# Patient Record
Sex: MALE | Race: WHITE
[De-identification: names, ages, dates, MRNs, and addresses within clinical notes are randomized per-mention and may not be internally consistent; named-entity substitution may affect disease eponyms.]

---

## 2017-01-03 ENCOUNTER — HOSPITAL ENCOUNTER (EMERGENCY)
Dept: HOSPITAL 47 - EC | Age: 22
Discharge: HOME | End: 2017-01-03
Payer: COMMERCIAL

## 2017-01-03 VITALS — HEART RATE: 92 BPM | TEMPERATURE: 98.7 F | DIASTOLIC BLOOD PRESSURE: 85 MMHG | SYSTOLIC BLOOD PRESSURE: 177 MMHG

## 2017-01-03 VITALS — RESPIRATION RATE: 18 BRPM

## 2017-01-03 DIAGNOSIS — S09.90XA: Primary | ICD-10-CM
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
## 2017-01-03 DIAGNOSIS — Z91.048: ICD-10-CM

## 2017-01-03 PROCEDURE — 72125 CT NECK SPINE W/O DYE: CPT

## 2017-01-03 PROCEDURE — 99284 EMERGENCY DEPT VISIT MOD MDM: CPT

## 2017-01-03 PROCEDURE — 70450 CT HEAD/BRAIN W/O DYE: CPT
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
## 2018-08-10 ENCOUNTER — HOSPITAL ENCOUNTER (EMERGENCY)
Dept: HOSPITAL 47 - EC | Age: 23
Discharge: HOME | End: 2018-08-10
Payer: COMMERCIAL

## 2018-08-10 VITALS
RESPIRATION RATE: 80 BRPM | DIASTOLIC BLOOD PRESSURE: 55 MMHG | TEMPERATURE: 98.1 F | HEART RATE: 18 BPM | SYSTOLIC BLOOD PRESSURE: 116 MMHG

## 2018-08-10 DIAGNOSIS — Z91.048: ICD-10-CM

## 2018-08-10 DIAGNOSIS — R10.9: Primary | ICD-10-CM

## 2018-08-10 PROCEDURE — 99283 EMERGENCY DEPT VISIT LOW MDM: CPT

## 2018-08-10 NOTE — ED
General Adult HPI





- General


Chief complaint: Abdominal Pain


Stated complaint: Sharp pain gallbladder related- 2-3 weeks


Time Seen by Provider: 08/10/18 22:09


Source: patient


Mode of arrival: ambulatory


Limitations: no limitations





- History of Present Illness


Initial comments: 


Tenzin is a 22-year-old previously healthy male with no significant past medical 

history presents to the ED today with a complaint of intermittent abdominal 

pain and the need for a work note.  Patient reports that for a number of years 

he has experienced intermittent abdominal cramping and frequent stooling.  

Patient reports that this is exacerbated by eating anything spicy.  Patient 

states that over the past 2-3 weeks he seems to be having more frequent 

episodes of abdominal cramping and loose stools.  He states that after eating he

'll have cramping that radiates from his left abdomen to his right abdomen and 

will subsequently have an episode of loose nonbloody stool.  He reports that he 

only has 1-2 stools daily.  He still been eating a normal diet and admits that 

he has a very unhealthy eating habits.  He states that he usually is only 1-2 

meals a day, eats a lot of fast food and junk food.  He reports that despite 

his symptoms being exacerbated by eating spicy food he chooses to eat this 

because of his favorite food.  Patient states that today he had abdominal 

cramping and was unable to go to work, he sought care through the urgent cares 

looking for a work note but they advised that with abdominal pain he needs to 

be evaluated in the emergency department so he came here.  Patient reports that 

his abdominal cramping subsided hours before arrival and he feels well.  He 

states that he is eager to go home and doesn't feel that he needs any further 

workup but does need a work note.





- Related Data


 Previous Rx's











 Medication  Instructions  Recorded


 


Dicyclomine [Bentyl] 10 mg PO TID #30 capsule 08/10/18











 Allergies











Allergy/AdvReac Type Severity Reaction Status Date / Time


 


cat dander Allergy  Dyspnea Verified 08/10/18 21:27














Review of Systems


ROS Statement: 


Those systems with pertinent positive or pertinent negative responses have been 

documented in the HPI.





ROS Other: All systems not noted in ROS Statement are negative.





Past Medical History


Past Medical History: No Reported History


History of Any Multi-Drug Resistant Organisms: None Reported


Past Surgical History: Orthopedic Surgery


Additional Past Surgical History / Comment(s): right arm


Past Psychological History: No Psychological Hx Reported


Smoking Status: Never smoker


Past Alcohol Use History: None Reported


Past Drug Use History: None Reported





General Exam


Limitations: no limitations


General appearance: alert, in no apparent distress


Head exam: Present: atraumatic, normocephalic


Eye exam: Present: normal appearance, PERRL


ENT exam: Present: normal exam


Neck exam: Present: normal inspection


Respiratory exam: Absent: respiratory distress


Cardiovascular Exam: Present: regular rate


GI/Abdominal exam: Present: soft, normal bowel sounds.  Absent: distended, 

tenderness, guarding, rebound, rigid, organomegaly, mass, bruit, pulsatile mass

, hernia


Rectal exam: Present: deferred


Extremities exam: Present: normal inspection


Back exam: Present: normal inspection


Neurological exam: Present: alert, oriented X3


Psychiatric exam: Present: normal affect, normal mood


Skin exam: Present: warm, dry, intact, normal color.  Absent: rash





Course


 Vital Signs











  08/10/18





  21:26


 


Temperature 98.5 F


 


Pulse Rate 76


 


Respiratory 20





Rate 


 


Blood Pressure 111/54


 


O2 Sat by Pulse 99





Oximetry 














Medical Decision Making





- Medical Decision Making


Weight 2-year-old male with no medical history with intermittent abdominal pain 

which is worse after eating, exacerbated by spicy or fried foods.  Pain is 

throughout the entire abdomen, not right upper quadrant.  Not associated with 

vomiting.  Patient has had similar symptoms for a number of years but worsening 

over the past 2-3 weeks.  Patient sought care today because he was unable to 

make it to work today and needed a work note.





Upon initial evaluation patient was asymptomatic and declined any further 

workup.. Physical exam was unremarkable, patient was in no acute distress, 

normal vital signs.  At this time I do feel the patient requires follow-up with 

gastroenterology for further diagnostic studies but is in no acute distress.





I offered evaluation including urinalysis, KUB x-ray and labs to the patient, 

he declined these stating that he doesn't feel they are indicated.





I encouraged the patient to begin keeping a food diary that includes 

documentation of what he eats and his subsequent symptoms and bowel movements.  

I advised him that he needs follow-up with gastroenterology and having a food 

diary would be beneficial.  Patient states that his sister has recently been 

following with gastroenterology and diagnosed with irritable bowel and he feels 

he has not.  He is agreeable to plan for follow-up.  Patient will be prescribed 

Bentyl for his abdominal cramping.  I also encouraged the patient to make 

dietary modifications to decrease symptoms.  All questions pertaining to care 

were answered best my ability patient was discharged home and given a work note 

for missing work today.








Disposition


Clinical Impression: 


 Abdominal cramping





Disposition: HOME SELF-CARE


Condition: Good


Instructions:  Irritable Bowel Syndrome (ED)


Prescriptions: 


Dicyclomine [Bentyl] 10 mg PO TID #30 capsule


Is patient prescribed a controlled substance at d/c from ED?: No


Referrals: 


Maryam Anglin DO [Primary Care Provider] - 1-2 days


Raymond Bran MD [STAFF PHYSICIAN] - 1-2 days


Time of Disposition: 22:54

## 2020-02-13 ENCOUNTER — HOSPITAL ENCOUNTER (EMERGENCY)
Dept: HOSPITAL 47 - EC | Age: 25
Discharge: HOME | End: 2020-02-13
Payer: COMMERCIAL

## 2020-02-13 VITALS
RESPIRATION RATE: 20 BRPM | DIASTOLIC BLOOD PRESSURE: 83 MMHG | HEART RATE: 102 BPM | TEMPERATURE: 98 F | SYSTOLIC BLOOD PRESSURE: 127 MMHG

## 2020-02-13 DIAGNOSIS — S62.653A: Primary | ICD-10-CM

## 2020-02-13 DIAGNOSIS — S62.655A: ICD-10-CM
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
## 2020-02-13 DIAGNOSIS — F17.200: ICD-10-CM
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
## 2020-02-13 DIAGNOSIS — Z91.048: ICD-10-CM
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
## 2020-02-13 PROCEDURE — 96372 THER/PROPH/DIAG INJ SC/IM: CPT

## 2020-02-13 PROCEDURE — 73130 X-RAY EXAM OF HAND: CPT

## 2020-02-13 PROCEDURE — 99284 EMERGENCY DEPT VISIT MOD MDM: CPT

## 2020-02-13 NOTE — ED
General Adult HPI





- General


Chief complaint: Extremity Injury, Upper


Stated complaint: hand injury


Time Seen by Provider: 02/13/20 14:11


Source: patient, RN notes reviewed, old records reviewed


Mode of arrival: ambulatory


Limitations: no limitations





- History of Present Illness


Initial comments: 


24-year-old male patient presented with chief complaint of injury to left hand. 

Patient was that he moving a bathtub when it drops on his fingers.  Patient 

reports that he has pain in the second through fifth digits of his left hand.  

Patient believes that he has a  fracture.  Denies any other injury.  Denies any 

other complaints.





Systemic: Pt denies fatigue, fever/chills, rash. Pt denies weakness, night 

sweats, weight loss. 


Neuro: Pt denies headache, visual disturbances, syncope or pre-syncope.


HEENT: Pt denies ocular discharge or irritation, otalgia, rhinorrhea, 

pharyngitis or notable lymphadenopathy. 


Cardiopulmonary: Pt denies chest pain, SOB, heart palpitations, dyspnea on 

exertion.  


Abdominal/GI: Pt denies abdominal pain, n/v/d. 


: Pt denies dysuria, burning w/ urination, frequency/urgency. Denies new onset

urinary or bowel incontinence.  


MSK: Pt denies myalgia, loss of strength or function in extremities. 


Neuro: Pt denies new onset weakness, paresthesias. 








- Related Data


                                  Previous Rx's











 Medication  Instructions  Recorded


 


Dicyclomine [Bentyl] 10 mg PO TID #30 capsule 08/10/18











                                    Allergies











Allergy/AdvReac Type Severity Reaction Status Date / Time


 


cat dander Allergy  Dyspnea Verified 02/13/20 14:02














Review of Systems


ROS Statement: 


Those systems with pertinent positive or pertinent negative responses have been 

documented in the HPI.





ROS Other: All systems not noted in ROS Statement are negative.





Past Medical History


Past Medical History: No Reported History


History of Any Multi-Drug Resistant Organisms: None Reported


Past Surgical History: Orthopedic Surgery


Additional Past Surgical History / Comment(s): right arm


Past Psychological History: No Psychological Hx Reported


Smoking Status: Current some day smoker


Past Alcohol Use History: None Reported


Past Drug Use History: None Reported





General Exam





- General Exam Comments


Initial Comments: 





Constitutional: NAD, AOX3, Pt has pleasant affect. 


HEENT: NC/AT, trachea midline, neck supple, no lymphadenopathy. Posterior ph

arynx non erythematous, without exudates. External ears appear normal, without 

discharge. Mucous membranes moist. Eyes PERRLA, EOM intact. There is no scleral 

icterus. No pallor noted. 


Cardiopulmonary: RRR, no murmurs, rubs or gallops, no JVD noted. Lungs CTAB in 

anterior and posterior fields. No peripheral edema. 


Abdominal exam: Abdomen soft and non-distended. Abdomen non-tender to palpation 

in all 4 quadrants. Bowel sounds active in LLQ. No hepatosplenomegaly. No 

ecchymosis


Neuro: CN II-XII grossly intact. No nuchal rigidity. No raccon eyes, no rene 

sign, no hemotympanum. No cervical spinal tenderness. 


MSK: Tenderness to palpation mid shaft second through fourth digits.  No 

laceration.  Mild amount of soft tissue swelling.  Neurovascularly intact.  

Sensation intact.  Compartments are soft.  Most tenderness in the fourth digits 

mid shaft region.  Movement is intact.  No posterior calf tenderness 

bilaterally, homans sign negative bilaterally. Posterior tibialis and radial 

pulse +2 bilaterally. Sensation intact in upper and lower extremities. Full 

active ROM in upper and lower extremities, 5/5 stregnth. 








Limitations: no limitations





Course





                                   Vital Signs











  02/13/20





  14:03


 


Temperature 98.0 F


 


Pulse Rate 102 H


 


Respiratory 20





Rate 


 


Blood Pressure 127/83


 


O2 Sat by Pulse 97





Oximetry 














Medical Decision Making





- Medical Decision Making





24-year-old male patient is ED for evaluation of hand injury.  Patient will 

signs are stable, afebrile.  Physical exam slight tenderness to palpation mid 

shaft region second through fourth digits on left hand.  Neurovascularly intact.

  Plain films displayed acute comminuted nondisplaced fractures of the third and

 fourth middle phalanges of the left hand.  Patient placed in straight finger 

splint.  Will be discharged with outpatient orthopedic follow-up.  As well as 

primary care follow-up.  We'll turn the ER physician worsens.  Case discussed 

with Dr. Pleitez. 








Disposition


Clinical Impression: 


 Fracture of finger, middle phalanx, closed





Disposition: HOME SELF-CARE


Condition: Stable


Instructions (If sedation given, give patient instructions):  Finger Fracture 

(ED)


Additional Instructions: 


Follow-up with primary care provider tomorrow as well as orthopedic consult 

tomorrow.  Continue to wear finger splint. Follow up with Orthopedic consult for

 clearance to return to work.  Return to ER if condition worsens in any way.


Is patient prescribed a controlled substance at d/c from ED?: No


Referrals: 


Maryam Anglin DO [Primary Care Provider] - 1-2 days


Alex Webb MD [Medical Doctor] - 1-2 days

## 2020-02-13 NOTE — ED
Disposition


Clinical Impression: 


 Fracture of finger, middle phalanx, closed





Disposition: HOME SELF-CARE


Condition: Stable


Instructions (If sedation given, give patient instructions):  Finger Fracture 

(ED)


Additional Instructions: 


Follow-up with primary care provider tomorrow as well as orthopedic consult 

tomorrow.  Continue to wear finger splint. Follow up with Orthopedic consult for

clearance to return to work.  Return to ER if condition worsens in any way.


Is patient prescribed a controlled substance at d/c from ED?: No


Referrals: 


Maryam Anglin DO [Primary Care Provider] - 1-2 days


Alex Webb MD [Medical Doctor] - 1-2 days





Procedures





- Orthopedic Splinting/Casting


  ** Injury #1


Side: left


Upper Extremity Injury Location: finger (3rd digit stright finger splint)


Upper Extremity Immobilizer: finger (other)





  ** Injury #2


Side: left


Upper Extremity Injury Location: finger


Upper Extremity Immobilizer: finger (other) (4th digit straight finger splint )

## 2020-02-15 ENCOUNTER — HOSPITAL ENCOUNTER (EMERGENCY)
Dept: HOSPITAL 47 - EC | Age: 25
Discharge: HOME | End: 2020-02-15
Payer: COMMERCIAL

## 2020-02-15 VITALS
DIASTOLIC BLOOD PRESSURE: 83 MMHG | HEART RATE: 97 BPM | TEMPERATURE: 98.2 F | RESPIRATION RATE: 16 BRPM | SYSTOLIC BLOOD PRESSURE: 146 MMHG

## 2020-02-15 DIAGNOSIS — Z91.048: ICD-10-CM

## 2020-02-15 DIAGNOSIS — S62.603D: Primary | ICD-10-CM

## 2020-02-15 DIAGNOSIS — F17.200: ICD-10-CM

## 2020-02-15 PROCEDURE — 99283 EMERGENCY DEPT VISIT LOW MDM: CPT

## 2020-02-15 NOTE — ED
Recheck HPI





- General


Chief Complaint: Recheck/Abnormal Lab/Rx


Stated Complaint: hand injury


Time Seen by Provider: 02/15/20 13:06


Source: patient, RN notes reviewed


Mode of arrival: ambulatory


Limitations: no limitations





- History of Present Illness


Initial Comments: 





24-year-old male presented from for recheck of his left hand third and fourth 

digit fracture.  Patient states that the swelling is still there has not gone 

down.  He has no increase in pain.  He states that he isn't does not have any 

pain at this time.  Denies any discoloration other than some mild bruising.  

Patient has a follow-up appointment on Wednesday.  Patient has not taken any 

recent Tylenol Motrin.





- Related Data


                                  Previous Rx's











 Medication  Instructions  Recorded


 


Dicyclomine [Bentyl] 10 mg PO TID #30 capsule 08/10/18











                                    Allergies











Allergy/AdvReac Type Severity Reaction Status Date / Time


 


cat dander Allergy  Dyspnea Verified 02/15/20 13:05














Review of Systems


ROS Statement: 


Those systems with pertinent positive or pertinent negative responses have been 

documented in the HPI.





ROS Other: All systems not noted in ROS Statement are negative.





Past Medical History


Past Medical History: No Reported History


History of Any Multi-Drug Resistant Organisms: None Reported


Past Surgical History: Orthopedic Surgery


Additional Past Surgical History / Comment(s): right arm


Past Psychological History: No Psychological Hx Reported


Smoking Status: Current some day smoker


Past Alcohol Use History: None Reported


Past Drug Use History: None Reported





General Exam


Limitations: no limitations


General appearance: alert, in no apparent distress


Head exam: Present: atraumatic, normocephalic, normal inspection


Respiratory exam: Present: normal lung sounds bilaterally.  Absent: respiratory 

distress, wheezes, rales, rhonchi, stridor


Cardiovascular Exam: Present: regular rate, normal rhythm, normal heart sounds. 

 Absent: systolic murmur, diastolic murmur, rubs, gallop, clicks


Extremities exam: Present: other (Left hand there is mild tenderness of the 

third and fourth digit there is nearly full range of motion with cap refill less

 than 2 seconds of all digits are neurovascular intact there is mild swelling 

just proximal to the third and fourth digit)





Course





                                   Vital Signs











  02/15/20





  13:02


 


Temperature 98.2 F


 


Pulse Rate 97


 


Respiratory 16





Rate 


 


Blood Pressure 146/83


 


O2 Sat by Pulse 100





Oximetry 














Medical Decision Making





- Medical Decision Making





I did remove the splint, exam patient's left hand is neurovascularly intact 

there is no concerns for compartment syndrome.  Patient is otherwise stable 

follow-up at his orthopedic appointment.





Disposition


Clinical Impression: 


 Fracture of middle phalanx of finger of left hand





Disposition: HOME SELF-CARE


Condition: Stable


Instructions (If sedation given, give patient instructions):  Finger Fracture 

(ED)


Additional Instructions: 


Please return to the Emergency Department if symptoms worsen or any other 

concerns.


Is patient prescribed a controlled substance at d/c from ED?: No


Referrals: 


Maryam Anglin DO [Primary Care Provider] - 1-2 days


Time of Disposition: 13:15

## 2020-07-10 NOTE — XR
EXAMINATION TYPE: XR hand complete LT

 

DATE OF EXAM: 2/13/2020

 

CLINICAL HISTORY: Left hand distal phalangeal pain after crush injury

 

TECHNIQUE:  Frontal, lateral and oblique images of the left hand are obtained.

 

COMPARISON: None.

 

FINDINGS: There are nondisplaced comminuted fractures of the middle phalanges of the third and fourth
 digits with overlying soft tissue swelling. Remaining osseous structures of the left hand appear int
act. No radiopaque foreign body is seen.

 

IMPRESSION:  Acute comminuted nondisplaced fractures of the third and fourth middle phalanges of the 
left hand. This message should have been sent to my out of office pool as I am not not in clinic today . Pt has h/o Cardiomyopathy, CHF, h/o MI. Pt should be evaluated in ER.  Pl inform pt and son .

## 2021-04-16 ENCOUNTER — HOSPITAL ENCOUNTER (EMERGENCY)
Dept: HOSPITAL 47 - EC | Age: 26
Discharge: HOME | End: 2021-04-16
Payer: COMMERCIAL

## 2021-04-16 VITALS
HEART RATE: 87 BPM | DIASTOLIC BLOOD PRESSURE: 76 MMHG | RESPIRATION RATE: 16 BRPM | SYSTOLIC BLOOD PRESSURE: 120 MMHG | TEMPERATURE: 99.7 F

## 2021-04-16 DIAGNOSIS — U07.1: Primary | ICD-10-CM

## 2021-04-16 DIAGNOSIS — Z87.891: ICD-10-CM

## 2021-04-16 LAB
PH UR: 6.5 [PH] (ref 5–8)
SP GR UR: 1.01 (ref 1–1.03)
UROBILINOGEN UR QL STRIP: <2 MG/DL (ref ?–2)

## 2021-04-16 PROCEDURE — 81003 URINALYSIS AUTO W/O SCOPE: CPT

## 2021-04-16 PROCEDURE — 99283 EMERGENCY DEPT VISIT LOW MDM: CPT

## 2021-04-16 PROCEDURE — 71046 X-RAY EXAM CHEST 2 VIEWS: CPT

## 2021-04-16 PROCEDURE — 87635 SARS-COV-2 COVID-19 AMP PRB: CPT

## 2021-04-16 NOTE — ED
Fever HPI





- General


Chief Complaint: Fever


Stated Complaint: Fever


Time Seen by Provider: 21 21:36


Source: patient


Mode of arrival: ambulatory


Limitations: no limitations





- History of Present Illness


Initial Comments: 





Patient is 25-year-old man presenting now with 2 days of fever and chills, 

headache and congestion, and myalgias.


MD Complaint: fever, other


Onset/Timin


-: days(s)


Context: sick contacts


Associated Symptoms: myalgias, headache, nasal congestion


Treatments Prior to Arrival: none





- Related Data


                                Home Medications











 Medication  Instructions  Recorded  Confirmed


 


No Known Home Medications  21











                                    Allergies











Allergy/AdvReac Type Severity Reaction Status Date / Time


 


cat dander Allergy  Dyspnea Verified 21 22:02














Review of Systems


ROS Statement: 


Those systems with pertinent positive or pertinent negative responses have been 

documented in the HPI.





ROS Other: All systems not noted in ROS Statement are negative.


Constitutional: Reports: fever, chills


Eyes: Denies: eye discharge


ENT: Reports: congestion.  Denies: ear pain, throat pain


Respiratory: Denies: cough, dyspnea


Cardiovascular: Denies: chest pain, palpitations


Gastrointestinal: Denies: abdominal pain, vomiting, diarrhea


Genitourinary: Denies: dysuria, hematuria


Musculoskeletal: Reports: myalgia.  Denies: back pain


Skin: Denies: rash


Neurological: Reports: headache.  Denies: weakness, numbness, confusion





Past Medical History


Past Medical History: No Reported History


History of Any Multi-Drug Resistant Organisms: None Reported


Past Surgical History: Orthopedic Surgery


Additional Past Surgical History / Comment(s): right arm


Past Psychological History: No Psychological Hx Reported


Smoking Status: Former smoker


Past Alcohol Use History: None Reported


Past Drug Use History: None Reported





General Exam


Limitations: no limitations


General appearance: alert, in no apparent distress


Head exam: Present: atraumatic, normocephalic


Eye exam: Present: normal appearance.  Absent: scleral icterus, conjunctival 

injection


ENT exam: Present: normal oropharynx


Neck exam: Present: normal inspection, full ROM.  Absent: meningismus


Respiratory exam: Present: normal lung sounds bilaterally.  Absent: respiratory 

distress, wheezes, rales, rhonchi, stridor


Cardiovascular Exam: Present: normal rhythm, tachycardia (Rate 108 bpm), normal 

heart sounds.  Absent: systolic murmur, diastolic murmur, rubs, gallop


GI/Abdominal exam: Present: soft.  Absent: distended, tenderness, guarding, 

rebound, rigid


Extremities exam: Present: normal inspection, normal capillary refill.  Absent: 

pedal edema, calf tenderness


Back exam: Present: normal inspection.  Absent: CVA tenderness (R), CVA 

tenderness (L)


Neurological exam: Present: alert


Skin exam: Present: warm, dry, intact, normal color.  Absent: rash





Course


                                   Vital Signs











  21





  21:07


 


Temperature 102.3 F H


 


Pulse Rate 128 H


 


Respiratory 18





Rate 


 


Blood Pressure 138/69


 


O2 Sat by Pulse 97





Oximetry 














Medical Decision Making





- Lab Data


                                   Lab Results











  21 Range/Units





  21:13 21:25 


 


Urine Color   Yellow  


 


Urine Appearance   Clear  (Clear)  


 


Urine pH   6.5  (5.0-8.0)  


 


Ur Specific Gravity   1.010  (1.001-1.035)  


 


Urine Protein   Negative  (Negative)  


 


Urine Glucose (UA)   Negative  (Negative)  


 


Urine Ketones   Negative  (Negative)  


 


Urine Blood   Negative  (Negative)  


 


Urine Nitrite   Negative  (Negative)  


 


Urine Bilirubin   Negative  (Negative)  


 


Urine Urobilinogen   <2.0  (<2.0)  mg/dL


 


Ur Leukocyte Esterase   Negative  (Negative)  


 


Coronavirus (PCR)  Detected A   (Not Detectd)  














Disposition


Clinical Impression: 


 COVID-19





Disposition: HOME SELF-CARE


Condition: Good


Instructions (If sedation given, give patient instructions):  Fever in Adults 

(ED), Coronavirus Disease 2019 (COVID-19)


Is patient prescribed a controlled substance at d/c from ED?: No


Referrals: 


Maryam Anglin DO [Primary Care Provider] - 1-2 days

## 2021-04-16 NOTE — XR
EXAMINATION TYPE: XR chest 2V

 

DATE OF EXAM: 4/16/2021

 

COMPARISON: NONE

 

HISTORY: Fever

 

TECHNIQUE: 2 views

 

FINDINGS: Heart and mediastinum are normal. Lungs are clear. Diaphragm is normal. Bony thorax appears
 normal.

 

IMPRESSION: Normal chest.